# Patient Record
Sex: MALE | Race: WHITE | NOT HISPANIC OR LATINO | Employment: OTHER | URBAN - METROPOLITAN AREA
[De-identification: names, ages, dates, MRNs, and addresses within clinical notes are randomized per-mention and may not be internally consistent; named-entity substitution may affect disease eponyms.]

---

## 2018-06-21 ENCOUNTER — TRANSCRIBE ORDERS (OUTPATIENT)
Dept: ADMINISTRATIVE | Facility: HOSPITAL | Age: 78
End: 2018-06-21

## 2018-06-21 DIAGNOSIS — Z13.9 ENCOUNTER FOR SCREENING: Primary | ICD-10-CM

## 2018-08-07 ENCOUNTER — HOSPITAL ENCOUNTER (OUTPATIENT)
Dept: CT IMAGING | Facility: HOSPITAL | Age: 78
Discharge: HOME | End: 2018-08-07
Attending: INTERNAL MEDICINE | Admitting: INTERNAL MEDICINE

## 2018-08-07 DIAGNOSIS — Z13.9 ENCOUNTER FOR SCREENING: ICD-10-CM

## 2018-08-07 PROCEDURE — 75571 CT HRT W/O DYE W/CA TEST: CPT

## 2022-03-07 ENCOUNTER — TRANSCRIBE ORDERS (OUTPATIENT)
Dept: ADMINISTRATIVE | Facility: HOSPITAL | Age: 82
End: 2022-03-07

## 2022-03-07 DIAGNOSIS — N63.0 BREAST NODULE: Primary | ICD-10-CM

## 2022-04-04 ENCOUNTER — HOSPITAL ENCOUNTER (OUTPATIENT)
Dept: MAMMOGRAPHY | Facility: HOSPITAL | Age: 82
Discharge: HOME | End: 2022-04-04

## 2022-04-04 ENCOUNTER — HOSPITAL ENCOUNTER (OUTPATIENT)
Dept: ULTRASOUND IMAGING | Facility: HOSPITAL | Age: 82
Discharge: HOME | End: 2022-04-04

## 2022-04-04 DIAGNOSIS — N63.0 BREAST NODULE: ICD-10-CM

## 2022-04-04 PROCEDURE — G0279 TOMOSYNTHESIS, MAMMO: HCPCS

## 2022-04-04 PROCEDURE — 77066 DX MAMMO INCL CAD BI: CPT

## 2022-04-04 PROCEDURE — 76642 ULTRASOUND BREAST LIMITED: CPT

## 2024-01-11 ENCOUNTER — TRANSCRIBE ORDERS (OUTPATIENT)
Dept: ADMINISTRATIVE | Facility: HOSPITAL | Age: 84
End: 2024-01-11
Payer: MEDICARE

## 2024-01-11 DIAGNOSIS — Z13.9 SCREENING DUE: Primary | ICD-10-CM

## 2024-03-11 ENCOUNTER — HOSPITAL ENCOUNTER (OUTPATIENT)
Dept: CT IMAGING | Facility: HOSPITAL | Age: 84
Discharge: HOME | End: 2024-03-11
Admitting: INTERNAL MEDICINE

## 2024-03-11 DIAGNOSIS — Z13.9 SCREENING DUE: ICD-10-CM

## 2024-03-11 PROCEDURE — 75571 CT HRT W/O DYE W/CA TEST: CPT

## 2024-06-06 ENCOUNTER — TRANSCRIBE ORDERS (OUTPATIENT)
Dept: ADMINISTRATIVE | Facility: HOSPITAL | Age: 84
End: 2024-06-06
Payer: MEDICARE

## 2024-06-06 ENCOUNTER — HOSPITAL ENCOUNTER (OUTPATIENT)
Dept: GENERAL RADIOLOGY | Facility: HOSPITAL | Age: 84
Discharge: HOME | End: 2024-06-06
Payer: MEDICARE

## 2024-06-06 DIAGNOSIS — R13.10 PROBLEMS WITH SWALLOWING AND MASTICATION: Primary | ICD-10-CM

## 2024-06-06 DIAGNOSIS — R13.10 PROBLEMS WITH SWALLOWING AND MASTICATION: ICD-10-CM

## 2024-06-06 PROCEDURE — 74220 X-RAY XM ESOPHAGUS 1CNTRST: CPT

## 2024-06-06 PROCEDURE — A9270 NON-COVERED ITEM OR SERVICE: HCPCS | Performed by: INTERNAL MEDICINE

## 2024-06-06 PROCEDURE — 74221 X-RAY XM ESOPHAGUS 2CNTRST: CPT

## 2024-06-06 PROCEDURE — 63710000001 SOD BICARB-CITRIC ACID-SIMETHICONE 2.21-1.53-0.04 G PACK: Performed by: INTERNAL MEDICINE

## 2024-06-06 PROCEDURE — 63710000001 BARIUM SULFATE 98 % RECONSTITUTED SUSPENSION: Performed by: INTERNAL MEDICINE

## 2024-06-06 PROCEDURE — 63710000001 BARIUM SULFATE 700 MG TABLET: Performed by: INTERNAL MEDICINE

## 2024-06-06 PROCEDURE — 63710000001 BARIUM SULFATE 96 % RECONSTITUTED SUSPENSION: Performed by: INTERNAL MEDICINE

## 2024-06-06 RX ADMIN — BARIUM SULFATE 135 ML: 980 POWDER, FOR SUSPENSION ORAL at 12:56

## 2024-06-06 RX ADMIN — ANTACID/ANTIFLATULENT 1 PACKET: 380; 550; 10; 10 GRANULE, EFFERVESCENT ORAL at 12:56

## 2024-06-06 RX ADMIN — BARIUM SULFATE 183 ML: 960 POWDER, FOR SUSPENSION ORAL at 12:56

## 2024-06-06 RX ADMIN — BARIUM SULFATE 700 MG: 700 TABLET ORAL at 12:56

## 2024-06-14 ENCOUNTER — TRANSCRIBE ORDERS (OUTPATIENT)
Dept: CARDIOLOGY | Facility: CLINIC | Age: 84
End: 2024-06-14
Payer: MEDICARE

## 2024-06-14 DIAGNOSIS — R00.1 BRADYCARDIA: ICD-10-CM

## 2024-06-14 DIAGNOSIS — I48.91 ATRIAL FIBRILLATION, UNSPECIFIED TYPE: Primary | ICD-10-CM

## 2024-07-16 RX ORDER — ZOLPIDEM TARTRATE 10 MG/1
1 TABLET ORAL DAILY
COMMUNITY

## 2024-07-16 RX ORDER — ASPIRIN 81 MG/1
81 TABLET, CHEWABLE ORAL DAILY
COMMUNITY
Start: 2010-09-22

## 2024-07-16 RX ORDER — FINASTERIDE 5 MG/1
1 TABLET, FILM COATED ORAL DAILY
COMMUNITY
Start: 2024-02-05

## 2024-07-16 RX ORDER — EZETIMIBE 10 MG/1
1 TABLET ORAL DAILY
COMMUNITY
Start: 2024-06-04

## 2024-07-16 RX ORDER — 1.1% SODIUM FLUORIDE PRESCRIPTION DENTAL CREAM 5 MG/G
CREAM DENTAL
COMMUNITY
Start: 2024-05-16

## 2024-07-16 RX ORDER — KETOCONAZOLE 20 MG/G
CREAM TOPICAL
COMMUNITY

## 2024-07-16 RX ORDER — MEMANTINE HYDROCHLORIDE 10 MG/1
1 TABLET ORAL 2 TIMES DAILY
COMMUNITY
Start: 2024-02-05

## 2024-07-16 RX ORDER — EVOLOCUMAB 140 MG/ML
INJECTION, SOLUTION SUBCUTANEOUS
COMMUNITY

## 2024-07-16 RX ORDER — FENOFIBRATE 160 MG/1
1 TABLET ORAL DAILY
COMMUNITY

## 2024-07-17 ENCOUNTER — OFFICE VISIT (OUTPATIENT)
Dept: CARDIOLOGY | Facility: CLINIC | Age: 84
End: 2024-07-17
Payer: MEDICARE

## 2024-07-17 VITALS
BODY MASS INDEX: 26.14 KG/M2 | SYSTOLIC BLOOD PRESSURE: 140 MMHG | HEART RATE: 55 BPM | DIASTOLIC BLOOD PRESSURE: 78 MMHG | HEIGHT: 70 IN | WEIGHT: 182.6 LBS

## 2024-07-17 DIAGNOSIS — I25.10 ATHEROSCLEROSIS OF NATIVE CORONARY ARTERY OF NATIVE HEART WITHOUT ANGINA PECTORIS: Primary | ICD-10-CM

## 2024-07-17 PROCEDURE — 99204 OFFICE O/P NEW MOD 45 MIN: CPT | Performed by: INTERNAL MEDICINE

## 2024-07-17 PROCEDURE — 1160F RVW MEDS BY RX/DR IN RCRD: CPT | Performed by: INTERNAL MEDICINE

## 2024-07-17 PROCEDURE — 1159F MED LIST DOCD IN RCRD: CPT | Performed by: INTERNAL MEDICINE

## 2024-07-17 PROCEDURE — 93000 ELECTROCARDIOGRAM COMPLETE: CPT | Performed by: INTERNAL MEDICINE

## 2024-07-17 RX ORDER — DIPHENOXYLATE HYDROCHLORIDE AND ATROPINE SULFATE 2.5; .025 MG/1; MG/1
TABLET ORAL DAILY
COMMUNITY

## 2024-07-17 NOTE — PROGRESS NOTES
[facility]      Patient Name: Aden Gonsalves  :1940  Age: 83 y.o.  Encounter Provider:  Sushil Sosa Jr, MD      Chief Complaint: Initial evaluation atrial arrhythmia      HPI  Aden Gonsalves is a 83 y.o. male recently diagnosed coronary atherosclerosis presents for evaluation of atrial arrhythmia.   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  He appropriately underwent stress echocardiogram at that time which showed no evidence of ischemia and good functional capacity.  He is a very active gentleman who continues to  lacrosse and take care of all activities of daily living with no limiting symptoms.  He denies angina.  No orthopnea, PND or edema.  No palpitations, dizziness or syncope.  He was having a burning sensation in the substernal region and underwent endoscopy at [facility] at which time he was supposedly seen to be in an irregular narrow complex tachycardia that he reports he was told was atrial fibrillation.  There is no documentation in the digital records that I can find.  This occurred during the procedure and there was no postprocedural EKG.  Dr. Warren ordered a Holter monitor that showed no evidence of atrial fibrillation or sustained arrhythmia.  There were brief episodes of SVT but no significant ectopic burden.  He denies palpitations, dizziness or syncope.  He is a former pipe smoker who quit drinking alcohol 40 years ago and denies illicit drug use.  He does have a family history of his father having a myocardial infarction in his late 60s.      The following portions of the patient's history were reviewed and updated as appropriate: allergies, current medications, past family history, past medical history, past social history, past surgical history and problem list.      Review of Systems   Constitutional: Negative for chills and fever.   HENT:   "Negative for hoarse voice and sore throat.    Eyes:  Negative for double vision and photophobia.   Cardiovascular:  Negative for chest pain, leg swelling, near-syncope, orthopnea, palpitations, paroxysmal nocturnal dyspnea and syncope.   Respiratory:  Negative for cough and wheezing.    Skin:  Negative for poor wound healing and rash.   Musculoskeletal:  Negative for arthritis and joint swelling.   Gastrointestinal:  Negative for bloating, abdominal pain, hematemesis and hematochezia.   Neurological:  Negative for dizziness and focal weakness.   Psychiatric/Behavioral:  Negative for depression and suicidal ideas.      OBJECTIVE:   Vital Signs  Vitals:    07/17/24 0834   BP: 140/78   Pulse: 55     Estimated body mass index is 26.2 kg/m² as calculated from the following:    Height as of this encounter: 177.8 cm (70\").    Weight as of this encounter: 82.8 kg (182 lb 9.6 oz).    Vitals reviewed.   Constitutional:       Appearance: Healthy appearance. Not in distress.   Neck:      Vascular: No JVR. JVD normal.   Pulmonary:      Effort: Pulmonary effort is normal.      Breath sounds: Normal breath sounds. No wheezing. No rhonchi. No rales.   Chest:      Chest wall: Not tender to palpatation.   Cardiovascular:      PMI at left midclavicular line. Normal rate. Regular rhythm. Normal S1. Normal S2.       Murmurs: There is no murmur.      No gallop.  No click. No rub.   Pulses:     Intact distal pulses.   Edema:     Peripheral edema absent.   Abdominal:      General: Bowel sounds are normal.      Palpations: Abdomen is soft.      Tenderness: There is no abdominal tenderness.   Musculoskeletal: Normal range of motion.         General: No tenderness. Skin:     General: Skin is warm and dry.   Neurological:      General: No focal deficit present.      Mental Status: Alert and oriented to person, place and time.       ECG 12 Lead    Date/Time: 7/17/2024 8:41 AM  Performed by: Sushil Sosa Jr., MD    Authorized by: Sheila, " Sushil Davidson MD  Comparison: not compared with previous ECG   Previous ECG: no previous ECG available  Rhythm: sinus rhythm  Ectopy: atrial premature contractions  Other findings: non-specific ST-T wave changes and early transition    Clinical impression: non-specific ECG           BUN   Date Value Ref Range Status   11/05/2018 10 7 - 20 mg/dL Final     Creatinine   Date Value Ref Range Status   11/05/2018 1.1 0.7 - 1.5 mg/dL Final     Potassium   Date Value Ref Range Status   11/05/2018 4.1 3.5 - 5.1 mmol/L Final     ALT (SGPT)   Date Value Ref Range Status   11/05/2018 31 13 - 69 U/L Final     AST (SGOT)   Date Value Ref Range Status   11/05/2018 32 15 - 46 U/L Final           ASSESSMENT:     83-year-old male presents for evaluation of atrial arrhythmia      PLAN OF CARE:     Coronary artery disease without angina -elevated calcium score indicative of coronary atherosclerosis.  Negative stress study.  Patient had brain fog with statins and is currently on PCSK9 inhibitor.  Continue aspirin.  No indication for further testing at this time.  PSVT -although there was some concern for atrial fibrillation during the procedure it sounds as though this was brief and self-limiting and I do not have any objective evidence for atrial fibrillation.  A Holter monitor was appropriately ordered and failed to demonstrate atrial fibrillation.  Patient is asymptomatic.  I have no indication for anticoagulation at this time.  We are going to monitor symptoms closely.  Mixed hyperlipidemia -adverse reaction to statins.  He is on PCSK9 inhibitor.  We will continue to monitor closely.    Return to clinic 6 months             Discharge Medications            Accurate as of July 17, 2024  8:41 AM. If you have any questions, ask your nurse or doctor.                Continue These Medications        Instructions Start Date   aspirin 81 MG chewable tablet   81 mg, Oral, Daily      ezetimibe 10 MG tablet  Commonly known as: ZETIA   1 tablet,  Oral, Daily      fenofibrate 160 MG tablet   1 tablet, Oral, Daily      finasteride 5 MG tablet  Commonly known as: PROSCAR   1 tablet, Oral, Daily      ketoconazole 2 % cream  Commonly known as: NIZORAL       memantine 10 MG tablet  Commonly known as: NAMENDA   1 tablet, Oral, 2 Times Daily      multivitamin tablet tablet   Oral, Daily      nystatin 100,000 unit/mL suspension  Commonly known as: MYCOSTATIN   SHAKE LIQUID AND TAKE 5 ML BY MOUTH FOUR TIMES DAILY RETAIN BY MOUTH AS LONG AS POSSIBLE BEFORE SWALLOWING      Repatha SureClick solution auto-injector SureClick injection  Generic drug: Evolocumab   Every 14 Days      SF 5000 Plus 1.1 % cream  Generic drug: Sodium Fluoride       zolpidem 10 MG tablet  Commonly known as: AMBIEN   1 tablet, Daily               Thank you for allowing me to participate in the care of your patient,      Sincerely,   Sushil Sosa MD  [facility]  07/17/24  08:41 EDT

## 2024-12-17 ENCOUNTER — TRANSCRIBE ORDERS (OUTPATIENT)
Dept: ADMINISTRATIVE | Facility: HOSPITAL | Age: 84
End: 2024-12-17
Payer: MEDICARE

## 2024-12-17 DIAGNOSIS — C80.1 MALIGNANT NEOPLASM: Primary | ICD-10-CM

## 2024-12-26 ENCOUNTER — HOSPITAL ENCOUNTER (OUTPATIENT)
Dept: NUCLEAR MEDICINE | Facility: HOSPITAL | Age: 84
Discharge: HOME | End: 2024-12-26
Payer: MEDICARE

## 2024-12-26 DIAGNOSIS — C80.1 MALIGNANT NEOPLASM: ICD-10-CM

## 2024-12-26 PROCEDURE — 78306 BONE IMAGING WHOLE BODY: CPT

## 2024-12-26 PROCEDURE — 34310000005 TECHNETIUM MEDRONATE KIT: Performed by: INTERNAL MEDICINE

## 2024-12-26 PROCEDURE — A9503 TC99M MEDRONATE: HCPCS | Performed by: INTERNAL MEDICINE

## 2024-12-26 RX ORDER — TC 99M MEDRONATE 20 MG/10ML
23 INJECTION, POWDER, LYOPHILIZED, FOR SOLUTION INTRAVENOUS
Status: COMPLETED | OUTPATIENT
Start: 2024-12-26 | End: 2024-12-26

## 2024-12-26 RX ADMIN — Medication 23 MILLICURIE: at 08:20

## 2025-01-28 ENCOUNTER — OFFICE VISIT (OUTPATIENT)
Dept: CARDIOLOGY | Facility: CLINIC | Age: 85
End: 2025-01-28
Payer: MEDICARE

## 2025-01-28 VITALS
WEIGHT: 185 LBS | SYSTOLIC BLOOD PRESSURE: 150 MMHG | BODY MASS INDEX: 26.48 KG/M2 | HEART RATE: 57 BPM | DIASTOLIC BLOOD PRESSURE: 72 MMHG | HEIGHT: 70 IN

## 2025-01-28 DIAGNOSIS — I25.10 ATHEROSCLEROSIS OF NATIVE CORONARY ARTERY OF NATIVE HEART WITHOUT ANGINA PECTORIS: Primary | ICD-10-CM

## 2025-01-28 DIAGNOSIS — I47.10 PAROXYSMAL SVT (SUPRAVENTRICULAR TACHYCARDIA): ICD-10-CM

## 2025-01-28 PROCEDURE — 99214 OFFICE O/P EST MOD 30 MIN: CPT | Performed by: NURSE PRACTITIONER

## 2025-01-28 PROCEDURE — 1160F RVW MEDS BY RX/DR IN RCRD: CPT | Performed by: NURSE PRACTITIONER

## 2025-01-28 PROCEDURE — 1159F MED LIST DOCD IN RCRD: CPT | Performed by: NURSE PRACTITIONER

## 2025-01-28 PROCEDURE — 93000 ELECTROCARDIOGRAM COMPLETE: CPT | Performed by: NURSE PRACTITIONER

## 2025-01-28 NOTE — PROGRESS NOTES
Date of Office Visit: 25  Encounter Provider: TYRELL Palencia  Place of Service: [facility] CARDIOLOGY  Patient Name: Aden Gonsalves  :1940    Chief Complaint   Patient presents with    Atherosclerosis of native coronary artery of native heart w    Follow-up   :     HPI: Aden Gonsalves is a 84 y.o. male  with coronary atherosclerosis, nonsustained SVT, premature atrial contraction, hyperlipidemia with prior statin intolerance, former pipe smoker.  He has family history of his father having a myocardial infarction in his 60s.      He is followed by Dr. Sushil Sosa. I will visit with him for the first time and have reviewed his medical record.     He had coronary calcium score of 267 in May 2014. Repeat calcium score 2018 was 404.  He had another calcium score 2024 which was 647.5.  Stress echo showed no evidence of ischemia with occasional PVCs.  He had an EGD and there was concern for atrial arrhythmia.  He wore a 7-day monitor which showed 1.5% PACs, nonsustained SVT and minimal ventricular ectopy.  He followed up with our office and there was no changes made.      He presents today for reassessment.  He denies palpitations or chest pain or tightness or pressure.  He has no shortness of breath or edema or dizziness.  No blood in the urine or stool with aspirin daily.  He does the elliptical and walks 3 days a week.  He does not check his blood pressure at home but states blood pressure is normally lower at his primary care office.    Allergies   Allergen Reactions    Codeine Hallucinations and Other (See Comments)     Visual hallucinations    Statins Other (See Comments)     Brain fog            Family and social history reviewed.     ROS  All other systems were reviewed and are negative          Objective:     Vitals:    25 0923   BP: 150/72   BP Location: Left arm   Patient Position: Sitting   Cuff Size: Adult   Pulse: 57   Weight: 83.9 kg (185  "lb)   Height: 177.8 cm (70\")     Body mass index is 26.54 kg/m².    PHYSICAL EXAM:  Pulmonary:      Effort: Pulmonary effort is normal.      Breath sounds: Normal breath sounds.   Cardiovascular:      Normal rate. Regular rhythm.           ECG 12 Lead    Date/Time: 1/28/2025 9:33 AM  Performed by: Yesi Marte APRN    Authorized by: Yesi Marte APRN  Comparison: compared with previous ECG   Similar to previous ECG  Rhythm: sinus rhythm  Ectopy: atrial premature contractions  Rate: normal  T inversion: III and aVF  QRS axis: left    Clinical impression: abnormal EKG  Comments: No significant change             Current Outpatient Medications   Medication Sig Dispense Refill    aspirin 81 MG chewable tablet Chew 1 tablet Daily.      ezetimibe (ZETIA) 10 MG tablet Take 1 tablet by mouth Daily.      fenofibrate 160 MG tablet Take 1 tablet by mouth Daily.      finasteride (PROSCAR) 5 MG tablet Take 1 tablet by mouth Daily.      ketoconazole (NIZORAL) 2 % cream       memantine (NAMENDA) 10 MG tablet Take 1 tablet by mouth 2 (Two) Times a Day.      multivitamin (MULTI VITAMIN DAILY PO) Take  by mouth Daily.      Repatha SureClick solution auto-injector SureClick injection Every 14 (Fourteen) Days.      SF 5000 Plus 1.1 % cream        No current facility-administered medications for this visit.     Assessment:       Diagnosis Plan   1. Atherosclerosis of native coronary artery of native heart without angina pectoris  ECG 12 Lead      2. Paroxysmal SVT (supraventricular tachycardia)  ECG 12 Lead           Orders Placed This Encounter   Procedures    ECG 12 Lead     This order was created via procedure documentation     Order Specific Question:   Release to patient     Answer:   Routine Release [[id number]]         Plan:       1.  84-year-old gentleman with coronary artery calcification/coronary artery disease with Agatston score of 647.5 March 2024.  Stress echo at that time showed no ischemia with preserved LV systolic " function.-he is on medical therapy with aspirin, ezetimibe and Repatha.  He is intolerant to statin.  He is exercising 3 days a week with elliptical/walking and has no exertional complaint.  I recommend follow-up in 1 year and call sooner with any issues  2.  Hyperlipidemia.  He has history of adverse reaction to statins with brain fog.  He is on ezetimibe, fenofibrate with Repatha.  His labs were checked with his PCP.  3.  Nonsustained supraventricular tachycardia.-No symptoms  4.  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  History of esophageal Candida  6.  Elevated blood pressure reading-normally lower than his PCP office.  I do not recommend any changes today.            It has been a pleasure to participate in this patient's care.      Thank you,  TYRELL Palencia      **I used Dragon to dictate this note:**